# Patient Record
Sex: FEMALE | Race: WHITE | NOT HISPANIC OR LATINO | Employment: FULL TIME | URBAN - METROPOLITAN AREA
[De-identification: names, ages, dates, MRNs, and addresses within clinical notes are randomized per-mention and may not be internally consistent; named-entity substitution may affect disease eponyms.]

---

## 2023-04-12 LAB
GRAM STAIN: NORMAL
STERILE FLUID CULTURE/SMEAR: NORMAL

## 2023-04-16 LAB
GRAM STAIN: NORMAL
TISSUE/WOUND CULTURE/SMEAR: NORMAL

## 2023-10-12 ENCOUNTER — APPOINTMENT (OUTPATIENT)
Dept: RADIOLOGY | Facility: CLINIC | Age: 58
End: 2023-10-12

## 2023-10-13 ENCOUNTER — ANCILLARY PROCEDURE (OUTPATIENT)
Dept: RADIOLOGY | Facility: CLINIC | Age: 58
End: 2023-10-13
Payer: COMMERCIAL

## 2023-10-13 DIAGNOSIS — Z13.6 ENCOUNTER FOR SCREENING FOR CARDIOVASCULAR DISORDERS: ICD-10-CM

## 2023-10-13 DIAGNOSIS — E78.00 PURE HYPERCHOLESTEROLEMIA, UNSPECIFIED: ICD-10-CM

## 2023-10-13 PROCEDURE — 75571 CT HRT W/O DYE W/CA TEST: CPT

## 2023-11-06 PROBLEM — D22.5 MELANOCYTIC NEVI OF TRUNK: Status: ACTIVE | Noted: 2022-11-09

## 2023-11-06 PROBLEM — E03.9 HYPOTHYROIDISM: Status: ACTIVE | Noted: 2017-04-27

## 2023-11-06 PROBLEM — L57.0 ACTINIC KERATOSIS: Status: ACTIVE | Noted: 2022-11-09

## 2023-11-06 PROBLEM — D22.70 MELANOCYTIC NEVI OF UNSPECIFIED LOWER LIMB, INCLUDING HIP: Status: ACTIVE | Noted: 2022-11-09

## 2023-11-06 PROBLEM — D12.6 ADENOMATOUS POLYP OF COLON: Status: ACTIVE | Noted: 2019-03-13

## 2023-11-06 PROBLEM — D17.9 LIPOMA: Status: ACTIVE | Noted: 2023-11-06

## 2023-11-06 PROBLEM — M77.8 SHOULDER TENDINITIS, LEFT: Status: ACTIVE | Noted: 2018-02-13

## 2023-11-06 PROBLEM — M25.612 STIFFNESS OF LEFT SHOULDER, NOT ELSEWHERE CLASSIFIED: Status: ACTIVE | Noted: 2023-11-06

## 2023-11-06 PROBLEM — H61.21 IMPACTED CERUMEN OF RIGHT EAR: Status: ACTIVE | Noted: 2023-11-06

## 2023-11-06 PROBLEM — D22.39 MELANOCYTIC NEVI OF OTHER PARTS OF FACE: Status: ACTIVE | Noted: 2022-11-09

## 2023-11-06 PROBLEM — M54.17 LUMBOSACRAL NEURITIS: Status: ACTIVE | Noted: 2023-11-06

## 2023-11-06 PROBLEM — L90.5 SCAR CONDITION AND FIBROSIS OF SKIN: Status: ACTIVE | Noted: 2022-11-09

## 2023-11-06 PROBLEM — Z98.890 STATUS POST ARTHROSCOPY OF LEFT SHOULDER: Status: ACTIVE | Noted: 2023-11-06

## 2023-11-06 PROBLEM — M19.019 SHOULDER ARTHRITIS: Status: ACTIVE | Noted: 2023-11-06

## 2023-11-06 PROBLEM — D22.60 MELANOCYTIC NEVI OF UNSPECIFIED UPPER LIMB, INCLUDING SHOULDER: Status: ACTIVE | Noted: 2022-11-09

## 2023-11-06 PROBLEM — D18.01 HEMANGIOMA OF SKIN AND SUBCUTANEOUS TISSUE: Status: ACTIVE | Noted: 2022-11-09

## 2023-11-06 PROBLEM — M25.519 SHOULDER PAIN: Status: ACTIVE | Noted: 2023-11-06

## 2023-11-06 PROBLEM — D22.4 MELANOCYTIC NEVI OF SCALP AND NECK: Status: ACTIVE | Noted: 2022-11-09

## 2023-11-06 PROBLEM — L81.4 OTHER MELANIN HYPERPIGMENTATION: Status: ACTIVE | Noted: 2022-11-09

## 2023-11-06 PROBLEM — D48.5 NEOPLASM OF UNCERTAIN BEHAVIOR OF SKIN: Status: ACTIVE | Noted: 2022-11-09

## 2023-11-06 PROBLEM — Z96.612 STATUS POST REPLACEMENT OF LEFT SHOULDER JOINT: Status: ACTIVE | Noted: 2023-11-06

## 2023-11-06 PROBLEM — L82.1 OTHER SEBORRHEIC KERATOSIS: Status: ACTIVE | Noted: 2022-11-09

## 2023-11-06 PROBLEM — L21.9 SEBORRHEIC DERMATITIS, UNSPECIFIED: Status: ACTIVE | Noted: 2022-11-09

## 2023-11-06 PROBLEM — M75.22 BICEPS TENDINITIS OF LEFT UPPER EXTREMITY: Status: ACTIVE | Noted: 2023-11-06

## 2023-11-06 PROBLEM — K63.5 COLON POLYPS: Status: ACTIVE | Noted: 2023-11-06

## 2023-11-06 PROBLEM — F32.A DEPRESSION: Status: ACTIVE | Noted: 2017-04-27

## 2023-11-06 PROBLEM — B07.9 VIRAL WART, UNSPECIFIED: Status: ACTIVE | Noted: 2022-11-09

## 2023-11-06 PROBLEM — L57.9 SKIN CHANGES DUE TO CHRONIC EXPOSURE TO NONIONIZING RADIATION, UNSPECIFIED: Status: ACTIVE | Noted: 2022-11-09

## 2023-11-06 RX ORDER — POLYETHYLENE GLYOCOL 3350, SODIUM CHLORIDE, SODIUM BICARBONATE AND POTASSIUM CHLORIDE 420; 11.2; 5.72; 1.48 G/4L; G/4L; G/4L; G/4L
POWDER, FOR SOLUTION NASOGASTRIC; ORAL
COMMUNITY
Start: 2017-07-07

## 2023-11-06 RX ORDER — SODIUM, POTASSIUM,MAG SULFATES 17.5-3.13G
SOLUTION, RECONSTITUTED, ORAL ORAL
COMMUNITY
Start: 2017-09-06

## 2023-11-06 RX ORDER — IPRATROPIUM BROMIDE 42 UG/1
SPRAY, METERED NASAL
COMMUNITY
Start: 2016-03-21

## 2023-11-06 RX ORDER — ACETAMINOPHEN 500 MG
5 TABLET ORAL AS NEEDED
COMMUNITY

## 2023-11-06 RX ORDER — MELOXICAM 15 MG/1
1 TABLET ORAL DAILY
COMMUNITY
Start: 2018-06-19

## 2023-11-06 RX ORDER — CYCLOBENZAPRINE HCL 10 MG
10 TABLET ORAL NIGHTLY PRN
COMMUNITY
Start: 2023-10-05

## 2023-11-06 RX ORDER — FLUOCINOLONE ACETONIDE 0.11 MG/ML
1 OIL TOPICAL
COMMUNITY
Start: 2020-11-04 | End: 2023-11-08 | Stop reason: SDUPTHER

## 2023-11-06 RX ORDER — MELOXICAM 7.5 MG/1
1 TABLET ORAL DAILY PRN
COMMUNITY
Start: 2018-11-21

## 2023-11-06 RX ORDER — FLUOCINONIDE 0.5 MG/G
CREAM TOPICAL
COMMUNITY
Start: 2022-11-09

## 2023-11-06 RX ORDER — CETIRIZINE HYDROCHLORIDE 10 MG/1
10 TABLET ORAL DAILY
COMMUNITY
Start: 2023-07-10

## 2023-11-06 RX ORDER — AMOXICILLIN 500 MG/1
CAPSULE ORAL ONCE AS NEEDED
COMMUNITY
Start: 2022-12-20

## 2023-11-06 RX ORDER — CLOBETASOL PROPIONATE 0.5 MG/G
CREAM TOPICAL
COMMUNITY
Start: 2023-07-11

## 2023-11-06 RX ORDER — ALBUTEROL SULFATE 90 UG/1
2 AEROSOL, METERED RESPIRATORY (INHALATION) EVERY 4 HOURS PRN
COMMUNITY
Start: 2017-08-01

## 2023-11-06 RX ORDER — LEVOTHYROXINE SODIUM 125 UG/1
125 TABLET ORAL
COMMUNITY
Start: 2023-08-10

## 2023-11-06 RX ORDER — LEVOFLOXACIN 500 MG/1
1 TABLET, FILM COATED ORAL DAILY
COMMUNITY
Start: 2017-08-01

## 2023-11-06 RX ORDER — FLUOXETINE HYDROCHLORIDE 20 MG/1
20 CAPSULE ORAL
COMMUNITY
Start: 2023-02-07

## 2023-11-06 RX ORDER — FLUOCINONIDE TOPICAL SOLUTION USP, 0.05% 0.5 MG/ML
SOLUTION TOPICAL
COMMUNITY
Start: 2023-08-06

## 2023-11-06 RX ORDER — KETOCONAZOLE 20 MG/ML
1 SHAMPOO, SUSPENSION TOPICAL
COMMUNITY
Start: 2020-11-04

## 2023-11-06 RX ORDER — FLUTICASONE PROPIONATE 50 MCG
SPRAY, SUSPENSION (ML) NASAL
COMMUNITY
Start: 2017-07-25

## 2023-11-06 RX ORDER — MULTIVITAMIN
TABLET ORAL
COMMUNITY
Start: 2010-07-08

## 2023-11-08 ENCOUNTER — OFFICE VISIT (OUTPATIENT)
Dept: DERMATOLOGY | Facility: CLINIC | Age: 58
End: 2023-11-08
Payer: COMMERCIAL

## 2023-11-08 DIAGNOSIS — L21.9 SEBORRHEIC DERMATITIS: ICD-10-CM

## 2023-11-08 DIAGNOSIS — Z12.83 ENCOUNTER FOR SCREENING FOR MALIGNANT NEOPLASM OF SKIN: ICD-10-CM

## 2023-11-08 DIAGNOSIS — L81.4 LENTIGO: ICD-10-CM

## 2023-11-08 DIAGNOSIS — L57.8 DIFFUSE PHOTODAMAGE OF SKIN: ICD-10-CM

## 2023-11-08 DIAGNOSIS — D48.5 NEOPLASM OF UNCERTAIN BEHAVIOR OF SKIN: ICD-10-CM

## 2023-11-08 DIAGNOSIS — Z86.018 HISTORY OF DYSPLASTIC NEVUS: Primary | ICD-10-CM

## 2023-11-08 DIAGNOSIS — D18.01 HEMANGIOMA OF SKIN: ICD-10-CM

## 2023-11-08 DIAGNOSIS — D22.9 MELANOCYTIC NEVUS, UNSPECIFIED LOCATION: ICD-10-CM

## 2023-11-08 PROCEDURE — 88305 TISSUE EXAM BY PATHOLOGIST: CPT | Mod: TC,DER | Performed by: DERMATOLOGY

## 2023-11-08 PROCEDURE — 88305 TISSUE EXAM BY PATHOLOGIST: CPT | Performed by: DERMATOLOGY

## 2023-11-08 PROCEDURE — 11301 SHAVE SKIN LESION 0.6-1.0 CM: CPT

## 2023-11-08 PROCEDURE — 99214 OFFICE O/P EST MOD 30 MIN: CPT | Performed by: DERMATOLOGY

## 2023-11-08 RX ORDER — FLUOCINOLONE ACETONIDE 0.11 MG/ML
1 OIL TOPICAL 2 TIMES WEEKLY
Qty: 120 ML | Refills: 3 | Status: SHIPPED | OUTPATIENT
Start: 2023-11-09 | End: 2024-11-08

## 2023-11-08 RX ORDER — KETOCONAZOLE 20 MG/ML
SHAMPOO, SUSPENSION TOPICAL 3 TIMES WEEKLY
Qty: 120 ML | Refills: 11 | Status: SHIPPED | OUTPATIENT
Start: 2023-11-08

## 2023-11-08 ASSESSMENT — DERMATOLOGY QUALITY OF LIFE (QOL) ASSESSMENT
RATE HOW BOTHERED YOU ARE BY EFFECTS OF YOUR SKIN PROBLEMS ON YOUR ACTIVITIES (EG, GOING OUT, ACCOMPLISHING WHAT YOU WANT, WORK ACTIVITIES OR YOUR RELATIONSHIPS WITH OTHERS): 0 - NEVER BOTHERED
ARE THERE EXCLUSIONS OR EXCEPTIONS FOR THE QUALITY OF LIFE ASSESSMENT: NO
RATE HOW EMOTIONALLY BOTHERED YOU ARE BY YOUR SKIN PROBLEM (FOR EXAMPLE, WORRY, EMBARRASSMENT, FRUSTRATION): 0 - NEVER BOTHERED
RATE HOW BOTHERED YOU ARE BY SYMPTOMS OF YOUR SKIN PROBLEM (EG, ITCHING, STINGING BURNING, HURTING OR SKIN IRRITATION): 0 - NEVER BOTHERED
WHAT SINGLE SKIN CONDITION LISTED BELOW IS THE PATIENT ANSWERING THE QUALITY-OF-LIFE ASSESSMENT QUESTIONS ABOUT: NONE OF THE ABOVE

## 2023-11-08 ASSESSMENT — DERMATOLOGY PATIENT ASSESSMENT
HAVE YOU HAD OR DO YOU HAVE VASCULAR DISEASE: NO
ARE YOU ON BIRTH CONTROL: NO
DO YOU HAVE ANY NEW OR CHANGING LESIONS: NO
DO YOU USE SUNSCREEN: DAILY
DO YOU USE A TANNING BED: NO
HAVE YOU HAD OR DO YOU HAVE A STAPH INFECTION: NO
ARE YOU TRYING TO GET PREGNANT: NO
ARE YOU AN ORGAN TRANSPLANT RECIPIENT: NO
DO YOU HAVE IRREGULAR MENSTRUAL CYCLES: NO

## 2023-11-08 NOTE — PROGRESS NOTES
Subjective     William Winston is a 58 y.o. female who presents for the following: Skin Check.  She denies any new, changing, or concerning skin lesions since her last visit; no bleeding, itching, or burning lesions.  She reports her scalp has been more flaky and itchy recently, and she thinks it is currently flaring.      Review of Systems:  No other skin or systemic complaints other than what is documented elsewhere in the note.    The following portions of the chart were reviewed this encounter and updated as appropriate:       Skin Cancer History  No skin cancer on file.    Specialty Problems          Dermatology Problems    Actinic keratosis    Hemangioma of skin and subcutaneous tissue    Melanocytic nevi of other parts of face    Melanocytic nevi of scalp and neck    Melanocytic nevi of trunk    Melanocytic nevi of unspecified lower limb, including hip    Melanocytic nevi of unspecified upper limb, including shoulder    Neoplasm of uncertain behavior of skin    Other melanin hyperpigmentation    Other seborrheic keratosis    Scar condition and fibrosis of skin    Seborrheic dermatitis, unspecified    Skin changes due to chronic exposure to nonionizing radiation, unspecified    Viral wart, unspecified       Past Dermatologic / Past Relevant Medical History:    - history of AKs   - mildly dysplastic compound nevus on mid right back on 3/22/16   - mildly dysplastic compound nevus on right lateral abdomen on 8/30/17  - no history of skin cancer, eczema, or psoriasis    Family History:    Mother - basal cell carcinoma  No family history of melanoma    Social History:    She lives in [place] and 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    Current Medications / CAM's:    Current Outpatient  Medications:     cyclobenzaprine (Flexeril) 10 mg tablet, Take 1 tablet (10 mg) by mouth as needed at bedtime., Disp: , Rfl:     fluocinonide (Lidex) 0.05 % external solution, PLEASE SEE ATTACHED FOR DETAILED DIRECTIONS, Disp: , Rfl:     FLUoxetine (PROzac) 20 mg capsule, Take 1 capsule (20 mg) by mouth once daily., Disp: , Rfl:     ketoconazole (NIZOral) 2 % shampoo, 1 Application., Disp: , Rfl:     melatonin 5 mg tablet, Take 1 tablet (5 mg) by mouth if needed., Disp: , Rfl:     multivitamin tablet, Take by mouth., Disp: , Rfl:     Synthroid 125 mcg tablet, Take 1 tablet (125 mcg) by mouth once daily., Disp: , Rfl:     albuterol (ProAir HFA) 90 mcg/actuation inhaler, Inhale 2 puffs every 4 hours if needed., Disp: , Rfl:     amoxicillin (Amoxil) 500 mg capsule, Take by mouth 1 time if needed. Take 1 hour before dental appointment, Disp: , Rfl:     cetirizine (ZyrTEC) 10 mg tablet, Take 1 tablet (10 mg) by mouth once daily., Disp: , Rfl:     clobetasol (Temovate) 0.05 % cream, APPLY AS DIRECTED TO AFFECTED AREA TWICE DAILY., Disp: , Rfl:     fluocinolone and shower cap 0.01 % oil, Apply 1 Application topically 2 times a week. When flaring, increase to twice a day for two weeks as needed., Disp: 120 mL, Rfl: 3    fluocinonide 0.05 % cream, 1 Application, Disp: , Rfl:     fluticasone (Flonase) 50 mcg/actuation nasal spray, Administer into affected nostril(s)., Disp: , Rfl:     ipratropium (Atrovent) 42 mcg (0.06 %) nasal spray, USE 2 SPRAYS IN EACH NOSTRIL 2 TO 3 TIMES DAILY., Disp: , Rfl:     ketoconazole (NIZOral) 2 % shampoo, Apply topically 3 times a week., Disp: 120 mL, Rfl: 11    levoFLOXacin (Levaquin) 500 mg tablet, Take 1 tablet (500 mg) by mouth once daily., Disp: , Rfl:     meloxicam (Mobic) 15 mg tablet, Take 1 tablet (15 mg) by mouth once daily., Disp: , Rfl:     meloxicam (Mobic) 7.5 mg tablet, Take 1 tablet (7.5 mg) by mouth once daily as needed., Disp: , Rfl:     polyethylene glycol-electrolytes  (TriLyte With Flavor Packets) 420 gram solution, drink 1/2 beginning at 6pm night before the procedure and start drinking the 2nd 1/2 5 hours before procedure time, Disp: , Rfl:     sodium,potassium,mag sulfates (Suprep Bowel Prep Kit) 17.5-3.13-1.6 gram recon soln solution, Take by mouth., Disp: , Rfl:      Objective   Well appearing patient in no apparent distress; mood and affect are within normal limits.    A full examination was performed including scalp, face, eyes, ears, nose, lips, neck, chest, axillae, abdomen, back, bilateral upper extremities, and bilateral lower extremities. All findings within normal limits unless otherwise noted below.    Assessment/Plan   1. History of dysplastic nevus  On the patient's mid right back and right lateral abdomen, there are well-healed scars with no evidence of recurrent growth or pigmentation on exam today.    History of dysplastic nevi and actinic keratoses and photodamage.  There is no evidence of recurrence on exam today.  We emphasized the importance of continuing to perform monthly self-skin exams using the ABCDs of monitoring moles, which were reviewed with the patient, as well as the importance of sun avoidance and sun protection with daily sunscreen use.  We will have the patient return to our office in 1 year, pending the above biopsy result, for routine follow-up and skin exam, and the patient was instructed to call our office should the patient notice any new, changing, symptomatic, or otherwise concerning skin lesions before then.  The patient expressed understanding and is in agreement with this plan.    2. Neoplasm of uncertain behavior of skin  Right lateral proximal arm  5 mm dark brown pigmented, asymmetric macule with an asymmetric pigment network and irregular borders          Shave removal    Lesion length (cm):  0.5  Margin per side (cm):  0.2  Lesion diameter (cm):  0.9  Informed consent: discussed and consent obtained    Timeout: patient name,  date of birth, surgical site, and procedure verified    Procedure prep:  Patient was prepped and draped  Anesthesia: the lesion was anesthetized in a standard fashion    Anesthetic:  1% lidocaine w/ epinephrine 1-100,000 local infiltration  Instrument used: flexible razor blade    Hemostasis achieved with: aluminum chloride    Outcome: patient tolerated procedure well    Post-procedure details: sterile dressing applied and wound care instructions given    Dressing type: bandage and petrolatum      Staff Communication: Dermatology Local Anesthesia: 1 % Lidocaine / Epinephrine - Amount:    Specimen 1 - Dermatopathology- DERM LAB  Differential Diagnosis: dysplastic nevus v MIS  Check Margins Yes/No?:    Comments:    Dermpath Lab: Routine Histopathology (formalin-fixed tissue)    3. Seborrheic dermatitis  Right Occipital Scalp  On the patient's right occipital scalp, there are pink, scaly patches with whitish-yellowish, greasy scale    Seborrheic Dermatitis - flare on scalp.  The potentially chronic and intermittently flaring nature of this condition and treatment options were discussed extensively with the patient today.  At this time, we recommend combination topical therapy, including topical anti-fungal therapy with Ketoconazole 2% shampoo, which the patient was instructed to use 2-3 days per week, alternating with over-the-counter anti-dandruff shampoos, such as Head & Shoulders, Selsun Blue, and Neutrogena T-gel, every month as well as topical steroid therapy with Fluocinonide 0.05% solution, which the patient was instructed to apply twice daily to the affected areas of the scalp for the next 2 weeks, followed by taper to twice daily on weekends only for persistent areas and/or maintenance; the patient may repeat treatment in a 2-week burst-and-taper fashion every 6-8 weeks as needed for future flares.  The risks, benefits, and side effects of these medications, including possible skin atrophy with overuse of  topical steroids, were discussed.  The patient expressed understanding and is in agreement with this plan.    ketoconazole (NIZOral) 2 % shampoo - Right Occipital Scalp  Apply topically 3 times a week.    Related Medications  fluocinolone and shower cap 0.01 % oil  Apply 1 Application topically 2 times a week. When flaring, increase to twice a day for two weeks as needed.    4. Melanocytic nevus, unspecified location  Scattered on the patient's face, neck, trunk, and extremities, there are multiple small, round- to oval-shaped, brown-pigmented and pink-colored, symmetric, uniform-appearing macules and dome-shaped papules    Clinically benign- to slightly atypical-appearing nevi - the clinically benign- to slightly atypical-appearing nature of the patient's nevi was discussed with the patient today.  None of the patient's nevi, with the exception of the one noted above, meet threshold for biopsy today.  We emphasized the importance of performing monthly self-skin exams using the ABCDs of monitoring moles, which were reviewed with the patient today and an informational hand-out provided.  We also emphasized the importance of sun avoidance and sun protection with daily sunscreen use.  The patient expressed understanding and is in agreement with this plan.    5. Lentigo  Chest - Medial (Center)  Multiple tan- to light brown-colored, round- to oval-shaped, symmetric and uniform-appearing macules and small patches consistent with lentigines scattered in sun-exposed areas.    Solar Lentigines and photodamage.  The clinically benign-appearing nature of these lesions and their relation to chronic sun exposure were discussed with the patient today and reassurance provided.  None of these lesions meet threshold for biopsy today, and thus no treatment is medically indicated for these lesions at this time.  The signs and symptoms of skin cancer were reviewed and the patient was advised to practice sun protection and sun  avoidance, use daily sunscreen, and perform regular self skin exams.  The patient was instructed to monitor these lesions for any changes, such as in size, shape, or color, or associated symptoms and to call our office to schedule a return visit for re-evaluation if any such changes or symptoms are noticed in the future.  The patient expressed understanding and is in agreement with this plan.    6. Hemangioma of skin  Scattered on the patient's face, neck, trunk, and extremities, there are multiple small, round, cherry red- to purplish-colored, symmetric, uniform, vascular-appearing macules and papules    Cherry Angiomas - the benign nature of these vascular lesions was discussed with the patient today and reassurance provided.  No treatment is medically indicated for these lesions at this time.    7. Encounter for screening for malignant neoplasm of skin    Related Procedures  Follow Up In Dermatology - Established Patient    8. Diffuse photodamage of skin  Photodistributed  Diffuse photodamage with actinic changes with telangiectasia and mottled pigmentation in sun-exposed areas.    Photodamage.  The signs and symptoms of skin cancer were reviewed and the patient was advised to practice sun protection and sun avoidance, use daily sunscreen, and perform regular self skin exams.  Sun protection was discussed, including avoiding the mid-day sun, wearing a sunscreen with SPF at least 50, and stressing the need for reapplication of sunscreen and applying more than they think they need.      I saw and evaluated the patient. I personally obtained the key and critical portions of the history and physical exam or was physically present for key and critical portions performed by the resident/fellow. I reviewed the resident/fellow's documentation and discussed the patient with the resident/fellow. I agree with the resident/fellow's medical decision making as documented in the note.    Dayton Mariano MD

## 2023-11-10 LAB
LABORATORY COMMENT REPORT: NORMAL
PATH REPORT.FINAL DX SPEC: NORMAL
PATH REPORT.GROSS SPEC: NORMAL
PATH REPORT.MICROSCOPIC SPEC OTHER STN: NORMAL
PATH REPORT.RELEVANT HX SPEC: NORMAL
PATH REPORT.TOTAL CANCER: NORMAL

## 2023-12-06 ENCOUNTER — TELEPHONE (OUTPATIENT)
Dept: DERMATOLOGY | Facility: CLINIC | Age: 58
End: 2023-12-06
Payer: COMMERCIAL

## 2023-12-06 DIAGNOSIS — L21.9 SEBORRHEIC DERMATITIS: Primary | ICD-10-CM

## 2023-12-06 RX ORDER — FLUOCINONIDE TOPICAL SOLUTION USP, 0.05% 0.5 MG/ML
SOLUTION TOPICAL
Qty: 60 ML | Refills: 2 | Status: SHIPPED | OUTPATIENT
Start: 2023-12-06

## 2023-12-06 NOTE — TELEPHONE ENCOUNTER
Pt went to pharmacy to  rx and it was Fluincinonide 0.1% oil , and it should have been Fluincinonide sol 0,5% please advise ..

## 2024-01-19 DIAGNOSIS — Z79.2 PROPHYLACTIC ANTIBIOTIC: Primary | ICD-10-CM

## 2024-01-19 RX ORDER — AMOXICILLIN 500 MG/1
2000 TABLET, FILM COATED ORAL ONCE
Qty: 4 TABLET | Refills: 0 | Status: SHIPPED | OUTPATIENT
Start: 2024-01-19 | End: 2024-01-19

## 2024-05-23 NOTE — PROGRESS NOTES
Subjective    Patient ID: William Winston is a 58 y.o. female.    Chief Complaint: No chief complaint on file.     Last Surgery: L TSR  Last Surgery Date: 01/27/22    HPI  WILLIAM WINSTON is a pleasant 57 year old female who returns to clinic for follow up visit for her left shoulder. She is status post left anatomic total shoulder replacement on 1/27/22 for arthritis. She continues to have left bicep pain that has not resolved with time, stretches and anti-inflammatories. She has had this bicep pain since prior to surgery taking place. Hurts mostly with abduction motion.     She is here today to discuss her MRI and CT results. She notes that she is doing her usual activities. This includes some light weight training, low impact cardio and some yoga but she notes she has backed off on this.    05/30/24  William returns to the clinic today for a repeat follow up visit regarding her left shoulder.     She reports that her shoulder is not bothersome, but her bicep muscle is bothering her. It is weak and she has not regained her strength. She reports that it is numb. Pushing herself up or turning in bed is painful     Past medical history, surgical history, social history, and family history were all reviewed and are as per the [facility] patient health history questionnaire form that was signed and scanned into the chart.    Objective   Ortho Exam  Patient is a well-developed, well-nourished female in no acute distress. Breathes with normal chest rises. Pupils are round and symmetric today. Awake alert and oriented x3.     Patient left shoulder incision is well healed. Neurovascularly intact distally. No pain with flexion and extension of wrist and elbow. Negative modified belly press test. 160 degrees of forward elevation. Internal rotation to T12. 70 degrees of external rotation. Abduction to 160 degrees. Palpable pain over bicipital groove. - modified belly press test     Image Results:      Assessment/Plan   Encounter  Diagnoses:  Status post shoulder replacement, left  ROZ is 2+ years s/p L TSR with continued pain likely related to referred pain from her rotator cuff    We had a long discussion regarding her options. She did very well initially with her shoulder replacement but has had continued pain for the last 2 years. We are going to obtain a CT scan to re-evaluate her shoulder in anticipation of possible arthroscopic clean up of her shoulder. We will follow up once her CT is completed to discuss the results and formulate a treatment plan.     Orders Placed This Encounter    XR shoulder left 2+ views     Follow up once CT is completed    Scribe Attestation  By signing my name below, INoemy Scribe   attest that this documentation has been prepared under the direction and in the presence of Yfn Aldridge MD.

## 2024-05-30 ENCOUNTER — OFFICE VISIT (OUTPATIENT)
Dept: ORTHOPEDIC SURGERY | Facility: HOSPITAL | Age: 59
End: 2024-05-30
Payer: COMMERCIAL

## 2024-05-30 ENCOUNTER — HOSPITAL ENCOUNTER (OUTPATIENT)
Dept: RADIOLOGY | Facility: HOSPITAL | Age: 59
Discharge: HOME | End: 2024-05-30
Payer: COMMERCIAL

## 2024-05-30 DIAGNOSIS — Z96.619 PAIN IN SHOULDER REGION AFTER SHOULDER REPLACEMENT: Primary | ICD-10-CM

## 2024-05-30 DIAGNOSIS — M25.519 PAIN IN SHOULDER REGION AFTER SHOULDER REPLACEMENT: Primary | ICD-10-CM

## 2024-05-30 DIAGNOSIS — Z96.612 STATUS POST SHOULDER REPLACEMENT, LEFT: ICD-10-CM

## 2024-05-30 PROCEDURE — 73030 X-RAY EXAM OF SHOULDER: CPT | Mod: LT

## 2024-05-30 PROCEDURE — 73030 X-RAY EXAM OF SHOULDER: CPT | Mod: LEFT SIDE | Performed by: RADIOLOGY

## 2024-05-30 PROCEDURE — 99214 OFFICE O/P EST MOD 30 MIN: CPT | Performed by: ORTHOPAEDIC SURGERY

## 2024-06-13 ENCOUNTER — HOSPITAL ENCOUNTER (OUTPATIENT)
Dept: RADIOLOGY | Facility: CLINIC | Age: 59
Discharge: HOME | End: 2024-06-13
Payer: COMMERCIAL

## 2024-06-13 DIAGNOSIS — Z96.619 PAIN IN SHOULDER REGION AFTER SHOULDER REPLACEMENT: ICD-10-CM

## 2024-06-13 DIAGNOSIS — M25.519 PAIN IN SHOULDER REGION AFTER SHOULDER REPLACEMENT: ICD-10-CM

## 2024-06-13 PROCEDURE — 73200 CT UPPER EXTREMITY W/O DYE: CPT | Mod: LT

## 2024-07-26 DIAGNOSIS — M75.102 TEAR OF LEFT ROTATOR CUFF, UNSPECIFIED TEAR EXTENT, UNSPECIFIED WHETHER TRAUMATIC: Primary | ICD-10-CM

## 2024-08-27 DIAGNOSIS — Z79.2 PROPHYLACTIC ANTIBIOTIC: Primary | ICD-10-CM

## 2024-08-27 RX ORDER — AMOXICILLIN 500 MG/1
2000 TABLET, FILM COATED ORAL ONCE
Qty: 4 TABLET | Refills: 0 | Status: SHIPPED | OUTPATIENT
Start: 2024-08-27 | End: 2024-08-27

## 2024-11-13 ENCOUNTER — APPOINTMENT (OUTPATIENT)
Dept: DERMATOLOGY | Facility: CLINIC | Age: 59
End: 2024-11-13
Payer: COMMERCIAL

## 2024-11-13 DIAGNOSIS — L81.4 LENTIGO: ICD-10-CM

## 2024-11-13 DIAGNOSIS — Z12.83 ENCOUNTER FOR SCREENING FOR MALIGNANT NEOPLASM OF SKIN: ICD-10-CM

## 2024-11-13 DIAGNOSIS — B07.0 PLANTAR WART: ICD-10-CM

## 2024-11-13 DIAGNOSIS — D18.01 HEMANGIOMA OF SKIN: ICD-10-CM

## 2024-11-13 DIAGNOSIS — D22.9 MELANOCYTIC NEVUS, UNSPECIFIED LOCATION: ICD-10-CM

## 2024-11-13 DIAGNOSIS — L21.9 SEBORRHEIC DERMATITIS: ICD-10-CM

## 2024-11-13 DIAGNOSIS — L57.0 ACTINIC KERATOSIS: ICD-10-CM

## 2024-11-13 DIAGNOSIS — L57.8 DIFFUSE PHOTODAMAGE OF SKIN: ICD-10-CM

## 2024-11-13 DIAGNOSIS — D48.5 NEOPLASM OF UNCERTAIN BEHAVIOR OF SKIN: Primary | ICD-10-CM

## 2024-11-13 DIAGNOSIS — L82.1 SEBORRHEIC KERATOSIS: ICD-10-CM

## 2024-11-13 DIAGNOSIS — Z86.018 HISTORY OF DYSPLASTIC NEVUS: ICD-10-CM

## 2024-11-13 RX ORDER — KETOCONAZOLE 20 MG/ML
SHAMPOO, SUSPENSION TOPICAL
Qty: 120 ML | Refills: 11 | Status: SHIPPED | OUTPATIENT
Start: 2024-11-13

## 2024-11-13 RX ORDER — FLUOCINONIDE TOPICAL SOLUTION USP, 0.05% 0.5 MG/ML
SOLUTION TOPICAL
Qty: 60 ML | Refills: 2 | Status: SHIPPED | OUTPATIENT
Start: 2024-11-13

## 2024-11-13 NOTE — PROGRESS NOTES
Subjective     William Winston is a 59 y.o. female who presents for the following: Skin Check.  She notes 2 brown spots on her left leg, which have increased in size and gotten darker in color over the past few months.  She also notes a few scaly bumps on her left foot and third toe, which have been present present for several months and sometimes hurt when she walks.  She also notes a dry, flaky, itchy scalp.  She denies any other new, changing, or concerning skin lesions since her last visit; no bleeding, itching, or burning lesions.      Review of Systems:  No other skin or systemic complaints other than what is documented elsewhere in the note.    The following portions of the chart were reviewed this encounter and updated as appropriate:       Skin Cancer History  No skin cancer on file.    Specialty Problems          Dermatology Problems    Actinic keratosis    Hemangioma of skin and subcutaneous tissue    Melanocytic nevi of other parts of face    Melanocytic nevi of scalp and neck    Melanocytic nevi of trunk    Melanocytic nevi of unspecified lower limb, including hip    Melanocytic nevi of unspecified upper limb, including shoulder    Neoplasm of uncertain behavior of skin    Other melanin hyperpigmentation    Other seborrheic keratosis    Scar condition and fibrosis of skin    Seborrheic dermatitis, unspecified    Skin changes due to chronic exposure to nonionizing radiation, unspecified    Viral wart, unspecified       Past Dermatologic / Past Relevant Medical History:    - history of Aks  - mildly dysplastic compound nevus on mid right back on 3/22/16  - mildly dysplastic compound nevus on right lateral abdomen on 8/30/17  - no history of skin cancer, eczema, or psoriasis    Family History:    Mother - basal cell carcinoma  No family history of melanoma    Social History:    The patient lives in [place] and 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    Current Medications / CAM's:    Current Outpatient Medications:     cyclobenzaprine (Flexeril) 10 mg tablet, Take 1 tablet (10 mg) by mouth as needed at bedtime., Disp: , Rfl:     fluocinonide (Lidex) 0.05 % external solution, PLEASE SEE ATTACHED FOR DETAILED DIRECTIONS, Disp: , Rfl:     fluocinonide 0.05 % cream, 1 Application, Disp: , Rfl:     FLUoxetine (PROzac) 20 mg capsule, Take 1 capsule (20 mg) by mouth once daily., Disp: , Rfl:     levoFLOXacin (Levaquin) 500 mg tablet, Take 1 tablet (500 mg) by mouth once daily., Disp: , Rfl:     melatonin 5 mg tablet, Take 1 tablet (5 mg) by mouth if needed., Disp: , Rfl:     multivitamin tablet, Take by mouth., Disp: , Rfl:     polyethylene glycol-electrolytes (TriLyte With Flavor Packets) 420 gram solution, drink 1/2 beginning at 6pm night before the procedure and start drinking the 2nd 1/2 5 hours before procedure time, Disp: , Rfl:     Synthroid 125 mcg tablet, Take 1 tablet (125 mcg) by mouth once daily., Disp: , Rfl:     albuterol (ProAir HFA) 90 mcg/actuation inhaler, Inhale 2 puffs every 4 hours if needed., Disp: , Rfl:     amoxicillin (Amoxil) 500 mg capsule, Take by mouth 1 time if needed. Take 1 hour before dental appointment, Disp: , Rfl:     cetirizine (ZyrTEC) 10 mg tablet, Take 1 tablet (10 mg) by mouth once daily., Disp: , Rfl:     clobetasol (Temovate) 0.05 % cream, APPLY AS DIRECTED TO AFFECTED AREA TWICE DAILY., Disp: , Rfl:     fluocinonide (Lidex) 0.05 % external solution, Apply twice daily to affected areas of scalp (avoid face, groin, body folds) for 2 weeks, taper to twice daily weekends only; repeat every 6-8 weeks as needed for flares, Disp: 60 mL, Rfl: 2    fluticasone (Flonase) 50 mcg/actuation nasal spray, Administer into affected nostril(s)., Disp: , Rfl:     ipratropium (Atrovent) 42 mcg (0.06 %) nasal spray, USE 2 SPRAYS IN EACH NOSTRIL 2 TO 3  TIMES DAILY., Disp: , Rfl:     ketoconazole (NIZOral) 2 % shampoo, Use as a shampoo daily for 2-4 weeks, then 2-3 x weekly for maintenance. Let sit 3-5 min before rinsing, Disp: 120 mL, Rfl: 11    meloxicam (Mobic) 15 mg tablet, Take 1 tablet (15 mg) by mouth once daily., Disp: , Rfl:     meloxicam (Mobic) 7.5 mg tablet, Take 1 tablet (7.5 mg) by mouth once daily as needed., Disp: , Rfl:     sodium,potassium,mag sulfates (Suprep Bowel Prep Kit) 17.5-3.13-1.6 gram recon soln solution, Take by mouth., Disp: , Rfl:      Objective   Well appearing patient in no apparent distress; mood and affect are within normal limits.    A full examination was performed including scalp, face, eyes, ears, nose, lips, neck, chest, axillae, abdomen, back, bilateral upper extremities, and bilateral lower extremities. All findings within normal limits unless otherwise noted below.    Assessment/Plan   1. Neoplasm of uncertain behavior of skin (2)  left medial proximal leg  1.5 cm dark brown pigmented, asymmetric patch with an asymmetric pigment network and irregular borders           Lesion biopsy  Type of biopsy: tangential    Informed consent: discussed and consent obtained    Timeout: patient name, date of birth, surgical site, and procedure verified    Procedure prep:  Patient was prepped and draped  Anesthesia: the lesion was anesthetized in a standard fashion    Anesthetic:  1% lidocaine w/ epinephrine 1-100,000 local infiltration  Instrument used: flexible razor blade    Hemostasis achieved with: aluminum chloride    Outcome: patient tolerated procedure well    Post-procedure details: wound care instructions given      Specimen 1 - Dermatopathology- DERM LAB  Differential Diagnosis: AMH v DF   Check Margins Yes/No?:    Comments:    Dermpath Lab: Routine Histopathology (formalin-fixed tissue)    left anterior mid leg  8 mm dark brown pigmented, asymmetric macule with an asymmetric pigment network and irregular borders            Shave removal    Lesion length (cm):  0.8  Lesion diameter (cm):  0.8  Informed consent: discussed and consent obtained    Timeout: patient name, date of birth, surgical site, and procedure verified    Procedure prep:  Patient was prepped and draped  Anesthesia: the lesion was anesthetized in a standard fashion    Anesthetic:  1% lidocaine w/ epinephrine 1-100,000 local infiltration  Instrument used: flexible razor blade    Hemostasis achieved with: aluminum chloride    Outcome: patient tolerated procedure well    Post-procedure details: sterile dressing applied and wound care instructions given    Dressing type: bandage and petrolatum      Specimen 2 - Dermatopathology- DERM LAB  Differential Diagnosis: SK v sccis  Check Margins Yes/No?:    Comments:    Dermpath Lab: Routine Histopathology (formalin-fixed tissue)    2. Plantar wart (3)  Left 3rd Metatarsal Plantar Area, Left 4th Metatarsal Plantar Area, Left Tip of 3rd Toe  On the plantar surface of her left third toe and her distal mid and lateral foot, there are 3 similar-appearing 5-7 mm flesh-colored, hyperkeratotic, verrucous papules with visible thrombosed capillaries    Plantar Warts - left 3rd toe and distal mid- and lateral foot.  The viral nature of these warts and treatment options were discussed extensively with the patient today.  At this time, we recommend treatment with liquid nitrogen cryotherapy in the office today.  Should any of the warts not resolve within 2-3 weeks following treatment today, the patient was instructed to call our office to schedule a return visit for re-evaluation and possible repeat treatment if indicated at that time.  The patient expressed understanding, is in agreement with this plan, and wishes to proceed with cryotherapy today.    Destr of lesion - Left 3rd Metatarsal Plantar Area, Left 4th Metatarsal Plantar Area, Left Tip of 3rd Toe  Complexity: simple    Destruction method: cryotherapy    Informed consent:  discussed and consent obtained    Lesion destroyed using liquid nitrogen: Yes    Outcome: patient tolerated procedure well with no complications    Post-procedure details: wound care instructions given      3. Actinic keratosis  Head - Anterior (Face)  On her left lateral lower cheek, there is a 1 cm erythematous, gritty, scaly macule    Actinic Keratosis - left lateral lower cheek.  The pre-cancerous nature of this lesion and treatment options were discussed with the patient today.  At this time, we recommend treatment with liquid nitrogen cryotherapy.  The patient expressed understanding, is in agreement with this plan, and wishes to proceed with cryotherapy today.    Destr of lesion - Head - Anterior (Face)  Complexity: simple    Destruction method: cryotherapy    Informed consent: discussed and consent obtained    Lesion destroyed using liquid nitrogen: Yes    Cryotherapy cycles:  1  Outcome: patient tolerated procedure well with no complications    Post-procedure details: wound care instructions given      4. Seborrheic dermatitis  Right Occipital Scalp  On the patient's scalp, worst on her right occipital scalp, there are erythematous, scaly patches with whitish-yellowish, greasy scale    Seborrheic Dermatitis - flare on scalp.  The potentially chronic and intermittently flaring nature of this condition and treatment options were discussed extensively with the patient today.  At this time, we recommend combination topical therapy, including topical anti-fungal therapy with Ketoconazole 2% shampoo, which the patient was instructed to use 2-3 days per week, alternating with over-the-counter anti-dandruff shampoos, such as Head & Shoulders, Selsun Blue, and Neutrogena T-gel, every month as well as topical steroid therapy with Fluocinonide 0.05% solution, which the patient was instructed to apply twice daily to the affected areas of the scalp for the next 2 weeks, followed by taper to twice daily on weekends only  for persistent areas and/or maintenance; the patient may repeat treatment in a 2-week burst-and-taper fashion every 6-8 weeks as needed for future flares.  The risks, benefits, and side effects of these medications, including possible skin atrophy with overuse of topical steroids, were discussed.  The patient expressed understanding and is in agreement with this plan.    Related Medications  ketoconazole (NIZOral) 2 % shampoo  Use as a shampoo daily for 2-4 weeks, then 2-3 x weekly for maintenance. Let sit 3-5 min before rinsing    fluocinonide (Lidex) 0.05 % external solution  Apply twice daily to affected areas of scalp (avoid face, groin, body folds) for 2 weeks, taper to twice daily weekends only; repeat every 6-8 weeks as needed for flares    5. Melanocytic nevus, unspecified location  Scattered on the patient's face, neck, trunk, and extremities, there are several small, round- to oval-shaped, brown-pigmented and pink-colored, symmetric, uniform-appearing macules and dome-shaped papules    Clinically benign- to slightly atypical-appearing nevi - the clinically benign- to slightly atypical-appearing nature of the patient's nevi was discussed with the patient today.  None of the patient's nevi, with the exception of the one noted above, meet threshold for biopsy today.  We emphasized the importance of performing monthly self-skin exams using the ABCDs of monitoring moles, which were reviewed with the patient today and an informational hand-out provided.  We also emphasized the importance of sun avoidance and sun protection with daily sunscreen use.  The patient expressed understanding and is in agreement with this plan.    6. Seborrheic keratosis  Scattered on the patient's face, neck, trunk, and extremities, there are multiple tan- to light brown-colored, hyperkeratotic, stuck-on appearing papules of varying size and shape    Seborrheic Keratoses - the benign nature of these lesions was discussed with the  patient today and reassurance provided.  No treatment is medically indicated for these lesions at this time.    7. Lentigo  Chest - Medial (Center)  Multiple tan- to light brown-colored, round- to oval-shaped, symmetric and uniform-appearing macules and small patches consistent with lentigines scattered in sun-exposed areas.    Solar Lentigines and photodamage.  The clinically benign-appearing nature of these lesions and their relation to chronic sun exposure were discussed with the patient today and reassurance provided.  None of these lesions meet threshold for biopsy today, and thus no treatment is medically indicated for these lesions at this time.  The signs and symptoms of skin cancer were reviewed and the patient was advised to practice sun protection and sun avoidance, use daily sunscreen, and perform regular self skin exams.  The patient was instructed to monitor these lesions for any changes, such as in size, shape, or color, or associated symptoms and to call our office to schedule a return visit for re-evaluation if any such changes or symptoms are noticed in the future.  The patient expressed understanding and is in agreement with this plan.    8. Hemangioma of skin  Scattered on the patient's face, neck, trunk, and extremities, there are multiple small, round, cherry red- to purplish-colored, symmetric, uniform, vascular-appearing macules and papules    Cherry Angiomas - the benign nature of these vascular lesions was discussed with the patient today and reassurance provided.  No treatment is medically indicated for these lesions at this time.    9. History of dysplastic nevus  On the patient's mid right back and right lateral abdomen, there are well-healed scars with no evidence of recurrent growth or pigmentation on exam today.    History of dysplastic nevi and actinic keratoses and photodamage.  There is no evidence of recurrence on exam today.  We emphasized the importance of continuing to perform  monthly self-skin exams using the ABCDs of monitoring moles, which were reviewed with the patient, as well as the importance of sun avoidance and sun protection with daily sunscreen use.  We will have the patient return to our office in 1 year, pending the above biopsy results, for routine follow-up and skin exam, and the patient was instructed to call our office should the patient notice any new, changing, symptomatic, or otherwise concerning skin lesions before then.  The patient expressed understanding and is in agreement with this plan.    10. Diffuse photodamage of skin  Photodistributed  Diffuse photodamage with actinic changes with telangiectasia and mottled pigmentation in sun-exposed areas.    Photodamage.  The signs and symptoms of skin cancer were reviewed and the patient was advised to practice sun protection and sun avoidance, use daily sunscreen, and perform regular self skin exams.  Sun protection was discussed, including avoiding the mid-day sun, wearing a sunscreen with SPF at least 50, and stressing the need for reapplication of sunscreen and applying more than they think they need.    11. Encounter for screening for malignant neoplasm of skin    Related Procedures  Follow Up In Dermatology - Established Patient        Seen and discussed with attending physician Dr. García Wood MD, PhD  Resident, Dermatology      I saw and evaluated the patient. I personally obtained the key and critical portions of the history and physical exam or was physically present for key and critical portions performed by the resident/fellow. I reviewed the resident/fellow's documentation and discussed the patient with the resident/fellow. I agree with the resident/fellow's medical decision making as documented in the note.    Dayton Mariano MD

## 2024-12-01 ENCOUNTER — OFFICE VISIT (OUTPATIENT)
Dept: URGENT CARE | Age: 59
End: 2024-12-01
Payer: COMMERCIAL

## 2024-12-01 ENCOUNTER — ANCILLARY PROCEDURE (OUTPATIENT)
Dept: URGENT CARE | Age: 59
End: 2024-12-01
Payer: COMMERCIAL

## 2024-12-01 VITALS
SYSTOLIC BLOOD PRESSURE: 101 MMHG | DIASTOLIC BLOOD PRESSURE: 67 MMHG | HEART RATE: 77 BPM | OXYGEN SATURATION: 98 % | TEMPERATURE: 98.6 F

## 2024-12-01 DIAGNOSIS — R05.1 ACUTE COUGH: ICD-10-CM

## 2024-12-01 DIAGNOSIS — J32.9 SINUSITIS, UNSPECIFIED CHRONICITY, UNSPECIFIED LOCATION: Primary | ICD-10-CM

## 2024-12-01 PROCEDURE — 99203 OFFICE O/P NEW LOW 30 MIN: CPT

## 2024-12-01 PROCEDURE — 71046 X-RAY EXAM CHEST 2 VIEWS: CPT

## 2024-12-01 RX ORDER — AMOXICILLIN AND CLAVULANATE POTASSIUM 875; 125 MG/1; MG/1
1 TABLET, FILM COATED ORAL 2 TIMES DAILY
Qty: 20 TABLET | Refills: 0 | Status: SHIPPED | OUTPATIENT
Start: 2024-12-01 | End: 2024-12-11

## 2024-12-01 ASSESSMENT — ENCOUNTER SYMPTOMS
COUGH: 1
SINUS PRESSURE: 1
FATIGUE: 1
SINUS PAIN: 1

## 2024-12-01 NOTE — PROGRESS NOTES
Subjective   Patient ID: William Winston is a 59 y.o. female. They present today with a chief complaint of Cough (Cough for a few weeks, getting worse x 2 weeks. ).    History of Present Illness  Patient is a 59-year-old female with history of hypothyroidism who presents to urgent care today with a complaint of ongoing, worsening productive cough.  She states her symptoms started at least 2 to 3 weeks ago.  She was initially seen at the [facility] clinic treated her with an albuterol inhaler and Tessalon Perles which she states she saw very little improvement.  She denies any sinus pain/pressure, fevers, chills, chest pain or shortness of breath.  No other complaints or concerns mention at this time.      History provided by:  Patient  Cough        Past Medical History  Allergies as of 12/01/2024    (No Known Allergies)       (Not in a hospital admission)         Past Medical History:   Diagnosis Date    Personal history of other diseases of the musculoskeletal system and connective tissue     History of backache    Personal history of other endocrine, nutritional and metabolic disease 05/21/2014    History of hypothyroidism    Personal history of other endocrine, nutritional and metabolic disease     History of hypothyroidism    Personal history of other mental and behavioral disorders     History of depression       Past Surgical History:   Procedure Laterality Date    APPENDECTOMY  06/05/2013    Appendectomy    BACK SURGERY  06/05/2013    Back Surgery    OTHER SURGICAL HISTORY  06/05/2013    Ovarian Cystectomy    OTHER SURGICAL HISTORY  06/05/2013    Liposuction    OTHER SURGICAL HISTORY  12/03/2020    Biceps tenodesis procedure            Review of Systems  Review of Systems   Constitutional:  Positive for fatigue.   HENT:  Positive for congestion, sinus pressure and sinus pain.    Respiratory:  Positive for cough.                                   Objective    Vitals:    12/01/24 1534   BP: 101/67   BP Location:  Left arm   Patient Position: Sitting   BP Cuff Size: Large adult   Pulse: 77   Temp: 37 °C (98.6 °F)   TempSrc: Oral   SpO2: 98%     No LMP recorded.    Physical Exam  Vitals and nursing note reviewed.   Constitutional:       General: She is not in acute distress.     Appearance: Normal appearance. She is not ill-appearing, toxic-appearing or diaphoretic.   HENT:      Head: Normocephalic and atraumatic.      Right Ear: Tympanic membrane, ear canal and external ear normal.      Left Ear: Tympanic membrane, ear canal and external ear normal.      Nose: Congestion present.      Mouth/Throat:      Mouth: Mucous membranes are moist.      Pharynx: Posterior oropharyngeal erythema present. No oropharyngeal exudate.   Eyes:      Extraocular Movements: Extraocular movements intact.      Conjunctiva/sclera: Conjunctivae normal.      Pupils: Pupils are equal, round, and reactive to light.   Cardiovascular:      Rate and Rhythm: Normal rate and regular rhythm.      Pulses: Normal pulses.      Heart sounds: Normal heart sounds.   Pulmonary:      Effort: Pulmonary effort is normal. No respiratory distress.      Breath sounds: No stridor. Rhonchi present. No wheezing or rales.   Chest:      Chest wall: No tenderness.   Musculoskeletal:         General: Normal range of motion.      Cervical back: Normal range of motion and neck supple.   Skin:     General: Skin is warm and dry.      Capillary Refill: Capillary refill takes less than 2 seconds.   Neurological:      General: No focal deficit present.      Mental Status: She is alert and oriented to person, place, and time.   Psychiatric:         Mood and Affect: Mood normal.         Behavior: Behavior normal.         Procedures      Assessment/Plan   Allergies, medications, history, and pertinent labs/EKGs/Imaging reviewed by ROBBY Emery.     Medical Decision Making    Patient is well appearing, afebrile, non toxic, not hypoxic, and appropriate for outpatient treatment  and management at time of evaluation. Patient presents with ongoing, worsening productive cough as described above.     Differential includes but not limited to: Pneumonia, bronchitis, URI, sinusitis, other    Consider viral testing however deferred due to length of illness.  Chest x-ray ordered to rule out pneumonia.  Image independently reviewed by myself and interpreted as no acute cardiopulmonary abnormality.  I discussed these findings with the patient.  I feel her cough and congestion are most likely due to postnasal drip secondary to a sinus infection.  A prescription for Augmentin called into her pharmacy use as directed.  ER precautions discussed.  Also recommended close follow-up PCP.  She is in agreement this plan.  She is discharged stable condition.  All questions and concerns addressed.         Orders and Diagnoses  There are no diagnoses linked to this encounter.    Medical Admin Record  === 12/01/24 ===    XR CHEST 2 VIEWS    - Impression -  1. No acute cardiopulmonary abnormality.      Signed by: Topher Horan 12/1/2024 4:27 PM  Dictation workstation:   [id number]      Follow Up Instructions  No follow-ups on file.    Patient disposition: Home    Electronically signed by ROBBY Emery  3:54 PM

## 2024-12-01 NOTE — PATIENT INSTRUCTIONS
You were seen at Urgent Care today and diagnosed with a sinus infection. Please treat as discussed. Please take medications as prescribed. Monitor for red flags which we spoke about, If your symptoms change, worsen or become concerning in any way, please go to the emergency room immediately, otherwise you can followup with your PCP in 2-3 days as needed

## 2024-12-10 ENCOUNTER — HOSPITAL ENCOUNTER (OUTPATIENT)
Dept: RADIOLOGY | Facility: CLINIC | Age: 59
Discharge: HOME | End: 2024-12-10
Payer: COMMERCIAL

## 2024-12-10 ENCOUNTER — OFFICE VISIT (OUTPATIENT)
Dept: URGENT CARE | Age: 59
End: 2024-12-10
Payer: COMMERCIAL

## 2024-12-10 VITALS
RESPIRATION RATE: 16 BRPM | HEART RATE: 73 BPM | SYSTOLIC BLOOD PRESSURE: 136 MMHG | DIASTOLIC BLOOD PRESSURE: 82 MMHG | OXYGEN SATURATION: 98 % | TEMPERATURE: 97.9 F

## 2024-12-10 DIAGNOSIS — S29.011A CHEST WALL MUSCLE STRAIN, INITIAL ENCOUNTER: Primary | ICD-10-CM

## 2024-12-10 DIAGNOSIS — S29.011A CHEST WALL MUSCLE STRAIN, INITIAL ENCOUNTER: ICD-10-CM

## 2024-12-10 DIAGNOSIS — J40 BRONCHITIS: ICD-10-CM

## 2024-12-10 PROCEDURE — 71101 X-RAY EXAM UNILAT RIBS/CHEST: CPT | Mod: LEFT SIDE | Performed by: RADIOLOGY

## 2024-12-10 PROCEDURE — 71101 X-RAY EXAM UNILAT RIBS/CHEST: CPT | Mod: LT

## 2024-12-10 RX ORDER — PROMETHAZINE HYDROCHLORIDE AND DEXTROMETHORPHAN HYDROBROMIDE 6.25; 15 MG/5ML; MG/5ML
5 SYRUP ORAL 3 TIMES DAILY PRN
Qty: 118 ML | Refills: 0 | Status: SHIPPED | OUTPATIENT
Start: 2024-12-10

## 2024-12-10 RX ORDER — KETOROLAC TROMETHAMINE 30 MG/ML
30 INJECTION, SOLUTION INTRAMUSCULAR; INTRAVENOUS ONCE
Status: COMPLETED | OUTPATIENT
Start: 2024-12-10 | End: 2024-12-10

## 2024-12-10 RX ORDER — MOMETASONE FUROATE 220 UG/1
1 INHALANT RESPIRATORY (INHALATION)
Qty: 1 EACH | Refills: 0 | Status: SHIPPED | OUTPATIENT
Start: 2024-12-10 | End: 2025-01-09

## 2024-12-10 RX ORDER — CYCLOBENZAPRINE HCL 10 MG
10 TABLET ORAL NIGHTLY PRN
Qty: 10 TABLET | Refills: 0 | Status: SHIPPED | OUTPATIENT
Start: 2024-12-10

## 2024-12-10 RX ORDER — PREDNISONE 10 MG/1
TABLET ORAL
Qty: 21 TABLET | Refills: 0 | Status: SHIPPED | OUTPATIENT
Start: 2024-12-10 | End: 2024-12-15

## 2024-12-10 ASSESSMENT — ENCOUNTER SYMPTOMS
COUGH: 1
BACK PAIN: 1

## 2024-12-10 NOTE — PROGRESS NOTES
Subjective   Patient ID: William Winston is a 59 y.o. female. They present today with a chief complaint of Cough, Injury, and Back Pain (PT presents with a cough that she has had for weeks and said se coughed so hard she heard a pop in her back and now she can barley move. ).    History of Present Illness    Cough    Injury  Associated symptoms: cough    Back Pain        Past Medical History  Allergies as of 12/10/2024    (No Known Allergies)       (Not in a hospital admission)       Past Medical History:   Diagnosis Date    Personal history of other diseases of the musculoskeletal system and connective tissue     History of backache    Personal history of other endocrine, nutritional and metabolic disease 05/21/2014    History of hypothyroidism    Personal history of other endocrine, nutritional and metabolic disease     History of hypothyroidism    Personal history of other mental and behavioral disorders     History of depression       Past Surgical History:   Procedure Laterality Date    APPENDECTOMY  06/05/2013    Appendectomy    BACK SURGERY  06/05/2013    Back Surgery    OTHER SURGICAL HISTORY  06/05/2013    Ovarian Cystectomy    OTHER SURGICAL HISTORY  06/05/2013    Liposuction    OTHER SURGICAL HISTORY  12/03/2020    Biceps tenodesis procedure            Review of Systems  Review of Systems   Respiratory:  Positive for cough.    Musculoskeletal:  Positive for back pain.                                  Objective    Vitals:    12/10/24 1122   BP: 136/82   BP Location: Left arm   Patient Position: Sitting   Pulse: 73   Resp: 16   Temp: 36.6 °C (97.9 °F)   SpO2: 98%     No LMP recorded (lmp unknown). Patient is postmenopausal.    Physical Exam  Constitutional:       Appearance: Normal appearance.   Pulmonary:      Effort: Pulmonary effort is normal.      Breath sounds: Normal breath sounds and air entry.      Comments: Severe cough  Chest:      Comments: Tenderness on left lateral Thoracic area  Neurological:       Mental Status: She is alert.         Procedures    Point of Care Test & Imaging Results from this visit  No results found for this visit on 12/10/24.   No results found.    Diagnostic study results (if any) were reviewed by Karli Kuo MD.    Assessment/Plan   Allergies, medications, history, and pertinent labs/EKGs/Imaging reviewed by Karli Kuo MD.     Medical Decision Making  Needs X ray    Orders and Diagnoses  There are no diagnoses linked to this encounter.    Medical Admin Record      Patient disposition: Home    Electronically signed by Karli Kuo MD  12:12 PM

## 2024-12-10 NOTE — PATIENT INSTRUCTIONS
I discussed X ray results with patient   Discontinue Fluoxetine while taking Promethazine and  Cyclobenzaprine

## 2025-01-09 ENCOUNTER — TELEPHONE (OUTPATIENT)
Dept: DERMATOLOGY | Facility: CLINIC | Age: 60
End: 2025-01-09
Payer: COMMERCIAL

## 2025-02-26 ENCOUNTER — APPOINTMENT (OUTPATIENT)
Dept: DERMATOLOGY | Facility: CLINIC | Age: 60
End: 2025-02-26
Payer: COMMERCIAL

## 2025-02-26 DIAGNOSIS — L57.8 DIFFUSE PHOTODAMAGE OF SKIN: ICD-10-CM

## 2025-02-26 DIAGNOSIS — B07.0 PLANTAR WART: Primary | ICD-10-CM

## 2025-02-26 DIAGNOSIS — L85.3 XEROSIS CUTIS: ICD-10-CM

## 2025-02-26 DIAGNOSIS — L82.1 SEBORRHEIC KERATOSIS: ICD-10-CM

## 2025-02-26 DIAGNOSIS — L81.4 LENTIGO: ICD-10-CM

## 2025-02-26 PROCEDURE — 99214 OFFICE O/P EST MOD 30 MIN: CPT | Performed by: DERMATOLOGY

## 2025-02-26 PROCEDURE — 17110 DESTRUCTION B9 LES UP TO 14: CPT | Performed by: DERMATOLOGY

## 2025-02-26 RX ORDER — UREA 200 MG/G
CREAM TOPICAL
Qty: 85 G | Refills: 2 | Status: SHIPPED | OUTPATIENT
Start: 2025-02-26

## 2025-02-27 NOTE — PROGRESS NOTES
Subjective     William Winston is a 59 y.o. female who presents for the following: Wart.  She was last seen in our office on 11/13/24, at which time she underwent liquid nitrogen cryotherapy for 3 warts on her left foot.    Today, the patient states the sites healed well following cryotherapy at her last visit.  However, she does not think they resolved completely, and they still hurt, especially when she walks.  She also notes a raised, rough bump on her left abdomen, which appeared since her last visit.  It has not changed in any way, including in size, shape, or color, and it is asymptomatic with no associated bleeding, itching, or pain, but it has not gone away.  Lastly, she notes dry skin and cracking on her feet, especially her heels.  She denies any other new, changing, or concerning skin lesions since her last visit; no bleeding, itching, or burning lesions.      Review of Systems:  No other skin or systemic complaints other than what is documented elsewhere in the note.    The following portions of the chart were reviewed this encounter and updated as appropriate:       Skin Cancer History  No skin cancer on file.    Specialty Problems          Dermatology Problems    Actinic keratosis    Hemangioma of skin and subcutaneous tissue    Melanocytic nevi of other parts of face    Melanocytic nevi of scalp and neck    Melanocytic nevi of trunk    Melanocytic nevi of unspecified lower limb, including hip    Melanocytic nevi of unspecified upper limb, including shoulder    Neoplasm of uncertain behavior of skin    Other melanin hyperpigmentation    Other seborrheic keratosis    Scar condition and fibrosis of skin    Seborrheic dermatitis, unspecified    Skin changes due to chronic exposure to nonionizing radiation, unspecified    Viral wart, unspecified       Past Dermatologic / Past Relevant Medical History:    - history of Aks  - mildly dysplastic compound nevus on mid right back on 3/22/16  - mildly dysplastic  compound nevus on right lateral abdomen on 8/30/17  - plantar warts  - seborrheic dermatitis  - no history of skin cancer, eczema, or psoriasis    Family History:    Mother - basal cell carcinoma  No family history of melanoma    Social History:    The patient lives in [place] and 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    Current Medications / CAM's:    Current Outpatient Medications:     cyclobenzaprine (Flexeril) 10 mg tablet, Take 1 tablet (10 mg) by mouth as needed at bedtime., Disp: , Rfl:     cyclobenzaprine (Flexeril) 10 mg tablet, Take 1 tablet (10 mg) by mouth as needed at bedtime for muscle spasms. Take it at bed time only , Do not drive, drink alcohol or take other sedatives, Disp: 10 tablet, Rfl: 0    fluocinonide (Lidex) 0.05 % external solution, PLEASE SEE ATTACHED FOR DETAILED DIRECTIONS, Disp: , Rfl:     fluocinonide (Lidex) 0.05 % external solution, Apply twice daily to affected areas of scalp (avoid face, groin, body folds) for 2 weeks, taper to twice daily weekends only; repeat every 6-8 weeks as needed for flares, Disp: 60 mL, Rfl: 2    fluocinonide 0.05 % cream, 1 Application, Disp: , Rfl:     FLUoxetine (PROzac) 20 mg capsule, Take 1 capsule (20 mg) by mouth once daily., Disp: , Rfl:     ketoconazole (NIZOral) 2 % shampoo, Use as a shampoo daily for 2-4 weeks, then 2-3 x weekly for maintenance. Let sit 3-5 min before rinsing, Disp: 120 mL, Rfl: 11    levoFLOXacin (Levaquin) 500 mg tablet, Take 1 tablet (500 mg) by mouth once daily., Disp: , Rfl:     melatonin 5 mg tablet, Take 1 tablet (5 mg) by mouth if needed., Disp: , Rfl:     multivitamin tablet, Take by mouth., Disp: , Rfl:     polyethylene glycol-electrolytes (TriLyte With Flavor Packets) 420 gram solution, drink 1/2 beginning at 6pm night before the procedure and start drinking the 2nd 1/2 5  hours before procedure time, Disp: , Rfl:     promethazine-DM (Phenergan-DM) 6.25-15 mg/5 mL syrup, Take 5 mL by mouth 3 times a day as needed for cough., Disp: 118 mL, Rfl: 0    Synthroid 125 mcg tablet, Take 1 tablet (125 mcg) by mouth once daily., Disp: , Rfl:     albuterol (ProAir HFA) 90 mcg/actuation inhaler, Inhale 2 puffs every 4 hours if needed., Disp: , Rfl:     amoxicillin (Amoxil) 500 mg capsule, Take by mouth 1 time if needed. Take 1 hour before dental appointment, Disp: , Rfl:     cetirizine (ZyrTEC) 10 mg tablet, Take 1 tablet (10 mg) by mouth once daily., Disp: , Rfl:     clobetasol (Temovate) 0.05 % cream, APPLY AS DIRECTED TO AFFECTED AREA TWICE DAILY., Disp: , Rfl:     fluticasone (Flonase) 50 mcg/actuation nasal spray, Administer into affected nostril(s)., Disp: , Rfl:     ipratropium (Atrovent) 42 mcg (0.06 %) nasal spray, USE 2 SPRAYS IN EACH NOSTRIL 2 TO 3 TIMES DAILY., Disp: , Rfl:     meloxicam (Mobic) 15 mg tablet, Take 1 tablet (15 mg) by mouth once daily., Disp: , Rfl:     meloxicam (Mobic) 7.5 mg tablet, Take 1 tablet (7.5 mg) by mouth once daily as needed., Disp: , Rfl:     mometasone (Asmanex Twisthaler) 220 mcg/ actuation (60) inhaler, Inhale 1 puff 2 times a day., Disp: 1 each, Rfl: 0    sodium,potassium,mag sulfates (Suprep Bowel Prep Kit) 17.5-3.13-1.6 gram recon soln solution, Take by mouth., Disp: , Rfl:     urea (Carmol) 20 % cream, Apply twice daily to affected areas of feet as needed for moisturization, Disp: 85 g, Rfl: 2     Objective   Well appearing patient in no apparent distress; mood and affect are within normal limits.    A skin examination was performed including: Face, neck, abdomen, and bilateral feet. All findings within normal limits unless otherwise noted below.    Assessment/Plan   1. Plantar wart (3)  Left 3rd Metatarsal Plantar Area, Left 4th Metatarsal Plantar Area, Left Tip of 3rd Toe  On the plantar surface of her left third toe and her distal mid and  lateral foot, there are 3 similar-appearing 3 mm flesh-colored, hyperkeratotic, slightly thickened, verrucous papules with visible thrombosed capillaries    Plantar Warts - left 3rd toe and distal mid- and lateral foot.  The viral nature of these warts and treatment options were discussed extensively with the patient today.  At this time, given the thickness of the warts, I recommend paring them down using a 15-blade followed by treatment with liquid nitrogen cryotherapy in the office today.  Should any of the warts not resolve within 2-3 weeks following treatment today, the patient was instructed to call our office to schedule a return visit for re-evaluation and possible repeat treatment if indicated at that time.  The patient expressed understanding, is in agreement with this plan, and wishes to proceed with paring and cryotherapy today.    Destr of lesion - Left 3rd Metatarsal Plantar Area, Left 4th Metatarsal Plantar Area, Left Tip of 3rd Toe  Complexity: simple    Destruction method: cryotherapy    Informed consent: discussed and consent obtained    Debridement: hyperkeratotic portion removed with sharp debridement    Lesion destroyed using liquid nitrogen: Yes    Cryotherapy cycles:  3  Outcome: patient tolerated procedure well with no complications    Post-procedure details: wound care instructions given      2. Seborrheic keratosis  On her left lateral mid abdomen, there is a 6 mm light brown-colored, hyperkeratotic, stuck on appearing papule.  Scattered on the patient's face, neck, and abdomen, there are several tan- to light brown-colored, hyperkeratotic, stuck-on appearing papules of varying size and shape    Seborrheic Keratoses - the benign nature of these lesions was discussed with the patient today and reassurance provided.  No treatment is medically indicated for these lesions at this time.    3. Xerosis cutis  Diffuse generalized dry, scaly skin, worst on her feet with several superficial fissures,  especially on her heels.    Xerosis.  I emphasized the importance of dry, sensitive skin care, including the use of a mild soap, such as Dove, and frequent and aggressive moisturization, at least twice daily and immediately following showers or baths, with recommended over-the-counter moisturizing creams, such as Eucerin, Cetaphil, Cerave, or Aveeno, or Vaseline or Aquaphor ointments.  In addition, for her feet, I recommend topical keratolytic therapy and moisturization with Urea 20% cream, which the patient was instructed to apply twice daily to the affected areas for moisturization.  The risks, benefits, and side effects of this medication were discussed.  The patient expressed understanding and is in agreement with this plan.    urea (Carmol) 20 % cream  Apply twice daily to affected areas of feet as needed for moisturization    4. Lentigo  Photodistributed  Multiple tan- to light brown-colored, round- to oval-shaped, symmetric and uniform-appearing macules and small patches consistent with lentigines scattered in sun-exposed areas.    Solar Lentigines and photodamage.  The clinically benign-appearing nature of these lesions and their relation to chronic sun exposure were discussed with the patient today and reassurance provided.  None of these lesions meet threshold for biopsy today, and thus no treatment is medically indicated for these lesions at this time.  The signs and symptoms of skin cancer were reviewed and the patient was advised to practice sun protection and sun avoidance, use daily sunscreen, and perform regular self skin exams.  The patient was instructed to monitor these lesions for any changes, such as in size, shape, or color, or associated symptoms and to call our office to schedule a return visit for re-evaluation if any such changes or symptoms are noticed in the future.  The patient expressed understanding and is in agreement with this plan.    5. Diffuse photodamage of  skin  Photodistributed  Diffuse photodamage with actinic changes with telangiectasia and mottled pigmentation in sun-exposed areas.    History of dysplastic nevi and actinic keratoses and photodamage.  The patient was recently seen in our office for routine follow-up and skin exam on 11/13/24, at which time no evidence of recurrence was noted.  I emphasized the importance of continuing to perform monthly self-skin exams using the ABCDs of monitoring moles, which were reviewed with the patient, as well as the importance of sun avoidance and sun protection with daily sunscreen use.  I will have the patient return to our office in 1 year from the date of her last visit for routine follow-up and skin exam, and the patient was instructed to call our office should the patient notice any new, changing, symptomatic, or otherwise concerning skin lesions before then.  The patient expressed understanding and is in agreement with this plan.

## 2025-05-01 ENCOUNTER — OFFICE VISIT (OUTPATIENT)
Dept: URGENT CARE | Age: 60
End: 2025-05-01
Payer: COMMERCIAL

## 2025-05-01 VITALS
HEART RATE: 79 BPM | HEIGHT: 71 IN | BODY MASS INDEX: 23.38 KG/M2 | SYSTOLIC BLOOD PRESSURE: 116 MMHG | OXYGEN SATURATION: 97 % | DIASTOLIC BLOOD PRESSURE: 76 MMHG | WEIGHT: 167 LBS | TEMPERATURE: 98.4 F

## 2025-05-01 DIAGNOSIS — J01.11 ACUTE RECURRENT FRONTAL SINUSITIS: Primary | ICD-10-CM

## 2025-05-01 RX ORDER — AMOXICILLIN AND CLAVULANATE POTASSIUM 875; 125 MG/1; MG/1
875 TABLET, FILM COATED ORAL 2 TIMES DAILY
Qty: 20 TABLET | Refills: 0 | Status: SHIPPED | OUTPATIENT
Start: 2025-05-01

## 2025-05-01 ASSESSMENT — ENCOUNTER SYMPTOMS
ENDOCRINE NEGATIVE: 1
SINUS COMPLAINT: 1
CONSTITUTIONAL NEGATIVE: 1
SINUS PAIN: 1
SINUS PRESSURE: 1
CARDIOVASCULAR NEGATIVE: 1
RESPIRATORY NEGATIVE: 1
VOICE CHANGE: 0
EYES NEGATIVE: 1
MUSCULOSKELETAL NEGATIVE: 1
NEUROLOGICAL NEGATIVE: 1
PSYCHIATRIC NEGATIVE: 1
FACIAL SWELLING: 0
GASTROINTESTINAL NEGATIVE: 1
HEMATOLOGIC/LYMPHATIC NEGATIVE: 1
RHINORRHEA: 0
TROUBLE SWALLOWING: 0
SORE THROAT: 0

## 2025-05-01 NOTE — PROGRESS NOTES
Subjective   Patient ID: William Winston is a 59 y.o. female. They present today with a chief complaint of Sinus Problem (Pt state [id number] says cough, ear clogged, and fatigue. Says phlegm is dark and thick ).    History of Present Illness  59-year-old female comes in today with a chief complaint of sinus congestion x 11 days.  Patient states that she has had some phlegm that has been dark and thick as well.  This has been giving her bilateral pain in her cheeks and in her forehead.  She states that her face feels heavy and full.  It has also been giving her headaches.  She has tried some albuterol, Mucinex and other over-the-counter medications which have not appeared to help.  She denies any sore throat, fever, nausea/vomiting/diarrhea, chest pain or shortness of breath.      Sinus Problem  Associated symptoms: congestion    Associated symptoms: no ear pain, no rhinorrhea and no sore throat        Past Medical History  Allergies as of 05/01/2025    (No Known Allergies)       Prescriptions Prior to Admission[1]     Medical History[2]    Surgical History[3]         Review of Systems  Review of Systems   Constitutional: Negative.    HENT:  Positive for congestion, sinus pressure and sinus pain. Negative for dental problem, drooling, ear discharge, ear pain, facial swelling, hearing loss, mouth sores, nosebleeds, postnasal drip, rhinorrhea, sneezing, sore throat, tinnitus, trouble swallowing and voice change.    Eyes: Negative.    Respiratory: Negative.     Cardiovascular: Negative.    Gastrointestinal: Negative.    Endocrine: Negative.    Genitourinary: Negative.    Musculoskeletal: Negative.    Neurological: Negative.    Hematological: Negative.    Psychiatric/Behavioral: Negative.     All other systems reviewed and are negative.                                 Objective    Vitals:    05/01/25 1018   BP: 116/76   Pulse: 79   Temp: 36.9 °C (98.4 °F)   TempSrc: Oral   SpO2: 97%   Weight: 75.8 kg (167 lb)   Height:  "1.803 m (5' 11\")     No LMP recorded (lmp unknown). Patient is postmenopausal.    Physical Exam  Vitals and nursing note reviewed.   Constitutional:       Appearance: Normal appearance.   HENT:      Head: Normocephalic and atraumatic.      Comments: Patient has mild pain on palpation over the maxillary and frontal sinuses.     Right Ear: Tympanic membrane normal.      Left Ear: Tympanic membrane normal.      Nose: Congestion present.      Mouth/Throat:      Mouth: Mucous membranes are dry.   Eyes:      Pupils: Pupils are equal, round, and reactive to light.   Cardiovascular:      Rate and Rhythm: Normal rate and regular rhythm.      Pulses: Normal pulses.   Pulmonary:      Effort: Pulmonary effort is normal.      Breath sounds: Normal breath sounds.   Musculoskeletal:      Cervical back: Normal range of motion and neck supple.   Neurological:      General: No focal deficit present.      Mental Status: She is oriented to person, place, and time.         Procedures    Point of Care Test & Imaging Results from this visit  No results found for this visit on 05/01/25.   Imaging  No results found.    Cardiology, Vascular, and Other Imaging  No other imaging results found for the past 2 days      Diagnostic study results (if any) were reviewed by Fer Valles PA-C.    Assessment/Plan   Allergies, medications, history, and pertinent labs/EKGs/Imaging reviewed by Fer Valles PA-C.     Medical Decision Making  59-year-old female comes in today with a chief complaint of sinus pain x 11 days.  Patient describes fullness in her face that is contributing to headaches.  She has been treated with albuterol and Mucinex in the past week, with no effect.  Based on the patient's overall length of illness and her symptoms, I will treat her with a prescription for Augmentin.  Patient stable for discharge and request to go home.  Discharge instructions be given.    Orders and Diagnoses  Diagnoses and all orders for this " visit:  Acute recurrent frontal sinusitis  -     amoxicillin-clavulanate (Augmentin) 875-125 mg tablet; Take 1 tablet (875 mg) by mouth 2 times a day.      Medical Admin Record      Patient disposition: Home    Electronically signed by Fer Valles PA-C  12:37 PM           [1] (Not in a hospital admission)  [2]   Past Medical History:  Diagnosis Date    Personal history of other diseases of the musculoskeletal system and connective tissue     History of backache    Personal history of other endocrine, nutritional and metabolic disease 05/21/2014    History of hypothyroidism    Personal history of other endocrine, nutritional and metabolic disease     History of hypothyroidism    Personal history of other mental and behavioral disorders     History of depression   [3]   Past Surgical History:  Procedure Laterality Date    APPENDECTOMY  06/05/2013    Appendectomy    BACK SURGERY  06/05/2013    Back Surgery    OTHER SURGICAL HISTORY  06/05/2013    Ovarian Cystectomy    OTHER SURGICAL HISTORY  06/05/2013    Liposuction    OTHER SURGICAL HISTORY  12/03/2020    Biceps tenodesis procedure